# Patient Record
Sex: FEMALE | Race: WHITE | URBAN - METROPOLITAN AREA
[De-identification: names, ages, dates, MRNs, and addresses within clinical notes are randomized per-mention and may not be internally consistent; named-entity substitution may affect disease eponyms.]

---

## 2020-12-15 ENCOUNTER — APPOINTMENT (RX ONLY)
Dept: URBAN - METROPOLITAN AREA CLINIC 150 | Facility: CLINIC | Age: 75
Setting detail: DERMATOLOGY
End: 2020-12-15

## 2020-12-15 DIAGNOSIS — Z71.89 OTHER SPECIFIED COUNSELING: ICD-10-CM

## 2020-12-15 DIAGNOSIS — L81.0 POSTINFLAMMATORY HYPERPIGMENTATION: ICD-10-CM

## 2020-12-15 DIAGNOSIS — L64.8 OTHER ANDROGENIC ALOPECIA: ICD-10-CM

## 2020-12-15 DIAGNOSIS — D18.0 HEMANGIOMA: ICD-10-CM

## 2020-12-15 DIAGNOSIS — L81.4 OTHER MELANIN HYPERPIGMENTATION: ICD-10-CM

## 2020-12-15 DIAGNOSIS — L65.0 TELOGEN EFFLUVIUM: ICD-10-CM

## 2020-12-15 DIAGNOSIS — L63.8 OTHER ALOPECIA AREATA: ICD-10-CM

## 2020-12-15 PROBLEM — D18.01 HEMANGIOMA OF SKIN AND SUBCUTANEOUS TISSUE: Status: ACTIVE | Noted: 2020-12-15

## 2020-12-15 PROCEDURE — ? COUNSELING

## 2020-12-15 PROCEDURE — 11900 INJECT SKIN LESIONS </W 7: CPT

## 2020-12-15 PROCEDURE — ? INTRALESIONAL KENALOG

## 2020-12-15 PROCEDURE — 99203 OFFICE O/P NEW LOW 30 MIN: CPT | Mod: 25

## 2020-12-15 PROCEDURE — ? ADDITIONAL NOTES

## 2020-12-15 PROCEDURE — ? FULL BODY SKIN EXAM - DECLINED

## 2020-12-15 PROCEDURE — ? PRESCRIPTION

## 2020-12-15 RX ORDER — CLOBETASOL PROPIONATE 0.5 MG/ML
SOLUTION TOPICAL
Qty: 1 | Refills: 2 | Status: ERX | COMMUNITY
Start: 2020-12-15

## 2020-12-15 RX ADMIN — CLOBETASOL PROPIONATE: 0.5 SOLUTION TOPICAL at 00:00

## 2020-12-15 ASSESSMENT — LOCATION DETAILED DESCRIPTION DERM
LOCATION DETAILED: LEFT INFERIOR ANTERIOR NECK
LOCATION DETAILED: SUPERIOR THORACIC SPINE
LOCATION DETAILED: RIGHT SUPERIOR UPPER BACK
LOCATION DETAILED: RIGHT SUPERIOR PARIETAL SCALP
LOCATION DETAILED: RIGHT CENTRAL PARIETAL SCALP
LOCATION DETAILED: RIGHT INFERIOR UPPER BACK

## 2020-12-15 ASSESSMENT — LOCATION SIMPLE DESCRIPTION DERM
LOCATION SIMPLE: SCALP
LOCATION SIMPLE: RIGHT UPPER BACK
LOCATION SIMPLE: LEFT ANTERIOR NECK
LOCATION SIMPLE: UPPER BACK

## 2020-12-15 ASSESSMENT — LOCATION ZONE DERM
LOCATION ZONE: TRUNK
LOCATION ZONE: NECK
LOCATION ZONE: SCALP

## 2020-12-15 NOTE — HPI: HAIR LOSS
Previous Labs: Yes
How Did The Hair Loss Occur?: gradual in onset
How Severe Is Your Hair Loss?: mild
What Hair Products Do You Use?: Manny

## 2022-01-17 ENCOUNTER — APPOINTMENT (RX ONLY)
Dept: URBAN - METROPOLITAN AREA CLINIC 150 | Facility: CLINIC | Age: 77
Setting detail: DERMATOLOGY
End: 2022-01-17

## 2022-01-17 DIAGNOSIS — Z71.89 OTHER SPECIFIED COUNSELING: ICD-10-CM

## 2022-01-17 DIAGNOSIS — L81.4 OTHER MELANIN HYPERPIGMENTATION: ICD-10-CM

## 2022-01-17 DIAGNOSIS — L30.9 DERMATITIS, UNSPECIFIED: ICD-10-CM

## 2022-01-17 PROCEDURE — ? TREATMENT REGIMEN

## 2022-01-17 PROCEDURE — ? PRESCRIPTION

## 2022-01-17 PROCEDURE — ? COUNSELING

## 2022-01-17 PROCEDURE — ? ADDITIONAL NOTES

## 2022-01-17 PROCEDURE — 99213 OFFICE O/P EST LOW 20 MIN: CPT

## 2022-01-17 PROCEDURE — ? FULL BODY SKIN EXAM - DECLINED

## 2022-01-17 PROCEDURE — ? OTHER

## 2022-01-17 RX ORDER — TRIAMCINOLONE ACETONIDE 0.25 MG/G
CREAM TOPICAL
Qty: 80 | Refills: 0 | Status: ERX | COMMUNITY
Start: 2022-01-17

## 2022-01-17 RX ADMIN — TRIAMCINOLONE ACETONIDE: 0.25 CREAM TOPICAL at 00:00

## 2022-01-17 ASSESSMENT — LOCATION SIMPLE DESCRIPTION DERM
LOCATION SIMPLE: RIGHT PRETIBIAL REGION
LOCATION SIMPLE: LEFT PRETIBIAL REGION
LOCATION SIMPLE: UPPER BACK
LOCATION SIMPLE: LEFT FOOT
LOCATION SIMPLE: LEFT FOREARM
LOCATION SIMPLE: LEFT ANTERIOR NECK
LOCATION SIMPLE: RIGHT UPPER BACK
LOCATION SIMPLE: RIGHT FOOT
LOCATION SIMPLE: CHEST

## 2022-01-17 ASSESSMENT — LOCATION DETAILED DESCRIPTION DERM
LOCATION DETAILED: LEFT VENTRAL DISTAL FOREARM
LOCATION DETAILED: LEFT INFERIOR ANTERIOR NECK
LOCATION DETAILED: LEFT DISTAL PRETIBIAL REGION
LOCATION DETAILED: SUPERIOR THORACIC SPINE
LOCATION DETAILED: RIGHT MEDIAL UPPER BACK
LOCATION DETAILED: RIGHT DISTAL PRETIBIAL REGION
LOCATION DETAILED: LEFT DORSAL FOOT
LOCATION DETAILED: LEFT MEDIAL SUPERIOR CHEST
LOCATION DETAILED: RIGHT DORSAL FOOT

## 2022-01-17 ASSESSMENT — LOCATION ZONE DERM
LOCATION ZONE: NECK
LOCATION ZONE: ARM
LOCATION ZONE: LEG
LOCATION ZONE: TRUNK
LOCATION ZONE: FEET

## 2022-01-17 NOTE — PROCEDURE: OTHER
Other (Free Text): recheck in 2 weeks; in the past, she has required oral steroids for eruptions; will check to be certain she is not in need of a higher strength topical steroid
Render Risk Assessment In Note?: no
Detail Level: Zone
Note Text (......Xxx Chief Complaint.): This diagnosis correlates with the

## 2022-01-21 PROBLEM — Z00.00 ENCOUNTER FOR PREVENTIVE HEALTH EXAMINATION: Status: ACTIVE | Noted: 2022-01-21

## 2022-02-15 ENCOUNTER — APPOINTMENT (RX ONLY)
Dept: URBAN - METROPOLITAN AREA CLINIC 150 | Facility: CLINIC | Age: 77
Setting detail: DERMATOLOGY
End: 2022-02-15

## 2022-02-15 DIAGNOSIS — L81.4 OTHER MELANIN HYPERPIGMENTATION: ICD-10-CM

## 2022-02-15 DIAGNOSIS — L30.9 DERMATITIS, UNSPECIFIED: ICD-10-CM | Status: IMPROVED

## 2022-02-15 PROCEDURE — ? COUNSELING

## 2022-02-15 PROCEDURE — ? FULL BODY SKIN EXAM - DECLINED

## 2022-02-15 PROCEDURE — ? TREATMENT REGIMEN

## 2022-02-15 PROCEDURE — 99213 OFFICE O/P EST LOW 20 MIN: CPT

## 2022-02-15 PROCEDURE — ? PRESCRIPTION

## 2022-02-15 PROCEDURE — ? ADDITIONAL NOTES

## 2022-02-15 RX ORDER — TRIAMCINOLONE ACETONIDE 0.25 MG/G
CREAM TOPICAL
Qty: 80 | Refills: 0 | Status: ERX

## 2022-02-15 ASSESSMENT — LOCATION SIMPLE DESCRIPTION DERM
LOCATION SIMPLE: LEFT PRETIBIAL REGION
LOCATION SIMPLE: LEFT FOOT
LOCATION SIMPLE: RIGHT PRETIBIAL REGION
LOCATION SIMPLE: CHEST
LOCATION SIMPLE: LEFT FOREARM
LOCATION SIMPLE: RIGHT UPPER BACK
LOCATION SIMPLE: LEFT ANTERIOR NECK
LOCATION SIMPLE: RIGHT FOOT

## 2022-02-15 ASSESSMENT — LOCATION DETAILED DESCRIPTION DERM
LOCATION DETAILED: LEFT VENTRAL DISTAL FOREARM
LOCATION DETAILED: LEFT MEDIAL SUPERIOR CHEST
LOCATION DETAILED: LEFT INFERIOR ANTERIOR NECK
LOCATION DETAILED: LEFT DISTAL PRETIBIAL REGION
LOCATION DETAILED: RIGHT DISTAL PRETIBIAL REGION
LOCATION DETAILED: LEFT DORSAL FOOT
LOCATION DETAILED: RIGHT MEDIAL UPPER BACK
LOCATION DETAILED: RIGHT DORSAL FOOT

## 2022-02-15 ASSESSMENT — LOCATION ZONE DERM
LOCATION ZONE: TRUNK
LOCATION ZONE: FEET
LOCATION ZONE: ARM
LOCATION ZONE: LEG
LOCATION ZONE: NECK

## 2022-02-15 NOTE — PROCEDURE: COUNSELING
Detail Level: Generalized
Patient Specific Counseling (Will Not Stick From Patient To Patient): continue moisturizers regularly; \\nuse topical steroids only if recurs; a refill was dispensed in the event the dermatitis recurs \\ndiscussed potential risks with topical steroid use
Detail Level: Detailed

## 2023-07-25 ENCOUNTER — OFFICE (OUTPATIENT)
Dept: URBAN - METROPOLITAN AREA CLINIC 8 | Facility: CLINIC | Age: 78
Setting detail: OPHTHALMOLOGY
End: 2023-07-25
Payer: MEDICARE

## 2023-07-25 DIAGNOSIS — H10.45: ICD-10-CM

## 2023-07-25 DIAGNOSIS — H00.14: ICD-10-CM

## 2023-07-25 DIAGNOSIS — H02.89: ICD-10-CM

## 2023-07-25 PROBLEM — H25.12 CATARACT SENILE NUCLEAR SCLEROSIS; LEFT EYE: Status: ACTIVE | Noted: 2023-07-25

## 2023-07-25 PROCEDURE — 99213 OFFICE O/P EST LOW 20 MIN: CPT

## 2023-07-25 ASSESSMENT — SPHEQUIV_DERIVED
OD_SPHEQUIV: -2.5
OS_SPHEQUIV: -3.75

## 2023-07-25 ASSESSMENT — REFRACTION_MANIFEST
OS_VA1: 20/40
OS_AXIS: 170
OD_ADD: +3.25
OS_CYLINDER: +0.50
OS_ADD: +3.25
OD_AXIS: 035
OD_VA1: 20/25
OS_SPHERE: -4.00
OD_SPHERE: -3.00
OD_CYLINDER: +1.00

## 2023-07-25 ASSESSMENT — LID EXAM ASSESSMENTS
OD_COMMENTS: 1+ 2+ MGD
OS_COMMENTS: 1+ 2+ MGD

## 2023-07-25 ASSESSMENT — CONFRONTATIONAL VISUAL FIELD TEST (CVF)
OD_FINDINGS: FULL
OS_FINDINGS: FULL

## 2023-07-25 ASSESSMENT — VISUAL ACUITY
OS_BCVA: 20/25
OD_BCVA: 20/25-3

## 2023-10-17 ENCOUNTER — APPOINTMENT (RX ONLY)
Dept: URBAN - METROPOLITAN AREA CLINIC 150 | Facility: CLINIC | Age: 78
Setting detail: DERMATOLOGY
End: 2023-10-17

## 2023-10-17 DIAGNOSIS — L57.0 ACTINIC KERATOSIS: ICD-10-CM

## 2023-10-17 PROCEDURE — 99213 OFFICE O/P EST LOW 20 MIN: CPT

## 2023-10-17 PROCEDURE — ? OTHER

## 2023-10-17 PROCEDURE — ? TREATMENT REGIMEN

## 2023-10-17 PROCEDURE — ? COUNSELING

## 2023-10-17 ASSESSMENT — PAIN INTENSITY VAS: HOW INTENSE IS YOUR PAIN 0 BEING NO PAIN, 10 BEING THE MOST SEVERE PAIN POSSIBLE?: NO PAIN

## 2023-10-17 NOTE — PROCEDURE: OTHER
Other (Free Text): Patient was given 5FU up [place]. She used it for 2 weeks. The spot has resolved. Other (Free Text): Patient was given 5FU up [place]. She used it for 2 weeks. The spot has resolved.

## 2023-10-17 NOTE — PROCEDURE: TREATMENT REGIMEN
Continue Regimen: Continue Vaseline for 1 more week. Ok to cover when she goes out
Detail Level: Zone

## 2024-02-21 ENCOUNTER — APPOINTMENT (RX ONLY)
Dept: URBAN - METROPOLITAN AREA CLINIC 150 | Facility: CLINIC | Age: 79
Setting detail: DERMATOLOGY
End: 2024-02-21

## 2024-02-21 DIAGNOSIS — L82.1 OTHER SEBORRHEIC KERATOSIS: ICD-10-CM

## 2024-02-21 DIAGNOSIS — D18.0 HEMANGIOMA: ICD-10-CM

## 2024-02-21 DIAGNOSIS — L73.8 OTHER SPECIFIED FOLLICULAR DISORDERS: ICD-10-CM

## 2024-02-21 DIAGNOSIS — L81.4 OTHER MELANIN HYPERPIGMENTATION: ICD-10-CM

## 2024-02-21 PROBLEM — D18.01 HEMANGIOMA OF SKIN AND SUBCUTANEOUS TISSUE: Status: ACTIVE | Noted: 2024-02-21

## 2024-02-21 PROCEDURE — ? COUNSELING

## 2024-02-21 PROCEDURE — 99213 OFFICE O/P EST LOW 20 MIN: CPT

## 2024-02-21 PROCEDURE — ? FULL BODY SKIN EXAM

## 2024-02-21 PROCEDURE — ? TREATMENT REGIMEN

## 2024-02-21 ASSESSMENT — LOCATION DETAILED DESCRIPTION DERM
LOCATION DETAILED: LEFT MEDIAL BREAST 10-11:00 REGION
LOCATION DETAILED: RIGHT INFERIOR UPPER BACK
LOCATION DETAILED: RIGHT PROXIMAL PRETIBIAL REGION
LOCATION DETAILED: SUBXIPHOID
LOCATION DETAILED: LEFT CLAVICULAR SKIN
LOCATION DETAILED: RIGHT SUPERIOR MEDIAL UPPER BACK
LOCATION DETAILED: LEFT LATERAL PROXIMAL UPPER ARM
LOCATION DETAILED: RIGHT FOREHEAD

## 2024-02-21 ASSESSMENT — LOCATION SIMPLE DESCRIPTION DERM
LOCATION SIMPLE: RIGHT UPPER BACK
LOCATION SIMPLE: RIGHT PRETIBIAL REGION
LOCATION SIMPLE: RIGHT FOREHEAD
LOCATION SIMPLE: LEFT BREAST
LOCATION SIMPLE: LEFT CLAVICULAR SKIN
LOCATION SIMPLE: ABDOMEN
LOCATION SIMPLE: LEFT UPPER ARM

## 2024-02-21 ASSESSMENT — LOCATION ZONE DERM
LOCATION ZONE: ARM
LOCATION ZONE: LEG
LOCATION ZONE: TRUNK
LOCATION ZONE: FACE

## 2024-10-01 ENCOUNTER — RX ONLY (RX ONLY)
Age: 79
End: 2024-10-01

## 2024-10-01 ENCOUNTER — OFFICE (OUTPATIENT)
Dept: URBAN - METROPOLITAN AREA CLINIC 8 | Facility: CLINIC | Age: 79
Setting detail: OPHTHALMOLOGY
End: 2024-10-01
Payer: MEDICARE

## 2024-10-01 DIAGNOSIS — H43.813: ICD-10-CM

## 2024-10-01 DIAGNOSIS — H01.002: ICD-10-CM

## 2024-10-01 DIAGNOSIS — H16.223: ICD-10-CM

## 2024-10-01 DIAGNOSIS — H01.001: ICD-10-CM

## 2024-10-01 PROBLEM — H35.40 PERIPAPILLARY ATROPHY: Status: ACTIVE | Noted: 2024-10-01

## 2024-10-01 PROCEDURE — 92014 COMPRE OPH EXAM EST PT 1/>: CPT

## 2024-10-01 ASSESSMENT — REFRACTION_AUTOREFRACTION
OD_CYLINDER: -1.00
OS_SPHERE: -1.75
OS_AXIS: 063
OS_CYLINDER: -0.50
OD_SPHERE: -1.75
OD_AXIS: 121

## 2024-10-01 ASSESSMENT — REFRACTION_CURRENTRX
OD_AXIS: 122
OS_AXIS: 024
OS_VPRISM_DIRECTION: PROGS
OS_CYLINDER: -0.75
OD_SPHERE: -2.50
OD_OVR_VA: 20/
OS_SPHERE: -2.75
OD_CYLINDER: -0.75
OS_ADD: +2.75
OD_VPRISM_DIRECTION: PROGS
OD_ADD: +2.75
OS_OVR_VA: 20/

## 2024-10-01 ASSESSMENT — REFRACTION_MANIFEST
OS_VA2: 20/20(J1+)
OD_ADD: +2.75
OD_SPHERE: -2.00
OS_AXIS: 065
OS_VA1: 20/25+
OS_SPHERE: -2.50
OD_CYLINDER: -0.50
OD_VA2: 20/20(J1+)
OS_ADD: +2.75
OD_AXIS: 120
OS_CYLINDER: -0.50
OU_VA: 20/20
OD_VA1: 20/20-2

## 2024-10-01 ASSESSMENT — TEAR BREAK UP TIME (TBUT)
OS_TBUT: 3 SECS
OD_TBUT: 3 SECS

## 2024-10-01 ASSESSMENT — KERATOMETRY
OD_K2POWER_DIOPTERS: 43.00
OS_K2POWER_DIOPTERS: 43.00
OD_AXISANGLE_DEGREES: 047
OD_K1POWER_DIOPTERS: 42.25
OS_K1POWER_DIOPTERS: 42.75
OS_AXISANGLE_DEGREES: 113

## 2024-10-01 ASSESSMENT — CONFRONTATIONAL VISUAL FIELD TEST (CVF)
OS_FINDINGS: FULL
OD_FINDINGS: FULL

## 2024-10-01 ASSESSMENT — LID EXAM ASSESSMENTS
OD_COMMENTS: 1+ 2+ MGD
OS_COMMENTS: 1+ 2+ MGD

## 2024-10-01 ASSESSMENT — SUPERFICIAL PUNCTATE KERATITIS (SPK)
OD_SPK: 1+
OS_SPK: T

## 2024-10-01 ASSESSMENT — VISUAL ACUITY
OS_BCVA: 20/25
OD_BCVA: 20/25

## 2025-07-02 ENCOUNTER — APPOINTMENT (OUTPATIENT)
Dept: ORTHOPEDIC SURGERY | Facility: CLINIC | Age: 80
End: 2025-07-02
Payer: MEDICARE

## 2025-07-02 ENCOUNTER — RX RENEWAL (OUTPATIENT)
Age: 80
End: 2025-07-02

## 2025-07-02 PROBLEM — Z78.9 NON-SMOKER: Status: ACTIVE | Noted: 2025-07-02

## 2025-07-02 PROCEDURE — 73030 X-RAY EXAM OF SHOULDER: CPT | Mod: LT

## 2025-07-02 PROCEDURE — 99204 OFFICE O/P NEW MOD 45 MIN: CPT

## 2025-07-02 RX ORDER — MONTELUKAST 10 MG/1
TABLET, FILM COATED ORAL
Refills: 0 | Status: ACTIVE | COMMUNITY

## 2025-07-02 RX ORDER — LEVOTHYROXINE SODIUM 0.17 MG/1
TABLET ORAL
Refills: 0 | Status: ACTIVE | COMMUNITY

## 2025-07-02 RX ORDER — VALSARTAN 40 MG/1
TABLET, COATED ORAL
Refills: 0 | Status: ACTIVE | COMMUNITY

## 2025-07-02 RX ORDER — ALBUTEROL 90 MCG
AEROSOL (GRAM) INHALATION
Refills: 0 | Status: ACTIVE | COMMUNITY

## 2025-07-02 RX ORDER — CELECOXIB 200 MG/1
200 CAPSULE ORAL
Qty: 95 | Refills: 0 | Status: ACTIVE | COMMUNITY
Start: 2025-07-02 | End: 1900-01-01

## 2025-07-02 RX ORDER — ATORVASTATIN CALCIUM 80 MG/1
TABLET, FILM COATED ORAL
Refills: 0 | Status: ACTIVE | COMMUNITY

## 2025-08-04 ENCOUNTER — RX RENEWAL (OUTPATIENT)
Age: 80
End: 2025-08-04

## 2025-08-04 RX ORDER — CELECOXIB 200 MG/1
200 CAPSULE ORAL
Qty: 90 | Refills: 0 | Status: ACTIVE | COMMUNITY
Start: 2025-08-04 | End: 1900-01-01

## 2025-08-05 RX ORDER — CELECOXIB 200 MG/1
200 CAPSULE ORAL DAILY
Qty: 90 | Refills: 0 | Status: ACTIVE | COMMUNITY
Start: 2025-08-05 | End: 1900-01-01

## 2025-08-13 ENCOUNTER — APPOINTMENT (OUTPATIENT)
Dept: MRI IMAGING | Facility: CLINIC | Age: 80
End: 2025-08-13

## 2025-08-13 ENCOUNTER — APPOINTMENT (OUTPATIENT)
Dept: ORTHOPEDIC SURGERY | Facility: CLINIC | Age: 80
End: 2025-08-13
Payer: MEDICARE

## 2025-08-13 DIAGNOSIS — M75.42 IMPINGEMENT SYNDROME OF LEFT SHOULDER: ICD-10-CM

## 2025-08-13 PROCEDURE — 99213 OFFICE O/P EST LOW 20 MIN: CPT
